# Patient Record
Sex: MALE | Race: BLACK OR AFRICAN AMERICAN | NOT HISPANIC OR LATINO | URBAN - METROPOLITAN AREA
[De-identification: names, ages, dates, MRNs, and addresses within clinical notes are randomized per-mention and may not be internally consistent; named-entity substitution may affect disease eponyms.]

---

## 2023-03-28 ENCOUNTER — OFFICE (OUTPATIENT)
Dept: URBAN - METROPOLITAN AREA CLINIC 19 | Facility: CLINIC | Age: 73
End: 2023-03-28

## 2023-03-28 VITALS
DIASTOLIC BLOOD PRESSURE: 61 MMHG | HEIGHT: 70 IN | SYSTOLIC BLOOD PRESSURE: 117 MMHG | WEIGHT: 229 LBS | OXYGEN SATURATION: 100 % | HEART RATE: 80 BPM

## 2023-03-28 DIAGNOSIS — Z86.010 PERSONAL HISTORY OF COLONIC POLYPS: ICD-10-CM

## 2023-03-28 DIAGNOSIS — K59.00 CONSTIPATION, UNSPECIFIED: ICD-10-CM

## 2023-03-28 DIAGNOSIS — R10.13 EPIGASTRIC PAIN: ICD-10-CM

## 2023-03-28 DIAGNOSIS — K21.9 GASTRO-ESOPHAGEAL REFLUX DISEASE WITHOUT ESOPHAGITIS: ICD-10-CM

## 2023-03-28 DIAGNOSIS — Z79.01 LONG TERM (CURRENT) USE OF ANTICOAGULANTS: ICD-10-CM

## 2023-03-28 PROCEDURE — 99204 OFFICE O/P NEW MOD 45 MIN: CPT

## 2023-03-28 RX ORDER — SODIUM PICOSULFATE, MAGNESIUM OXIDE, AND ANHYDROUS CITRIC ACID 10; 3.5; 12 MG/160ML; G/160ML; G/160ML
LIQUID ORAL
Qty: 320 | Refills: 0 | Status: COMPLETED
Start: 2023-03-28 | End: 2023-04-20

## 2023-03-28 NOTE — SERVICENOTES
The patient's assessment was reviewed with Dr. Camarillo and a collaborative plan of care was established.

## 2023-03-28 NOTE — SERVICEHPINOTES
72-year-old  black male referred by his PCP for chronic, worsening GERD, gas pain, heartburn and constipation.  He was recently increased from pantoprazole 40 milligrams daily to BID dosing as well as 40 milligrams daily.  He reports frequent breakthrough symptoms and heartburn.  He is currently in the midst of cardiac workup where he has been on Plavix for history of coronary artery disease and has had 5 stents placed in the past.  He was recently started on Xarelto in addition to the Plavix.  He does have a history of CHF as well as followed closely by cardiologist, Alex Hobbs MD.  He denies dysphagia.   He has also noticed black stool.  He typically has a bowel movement every few days and has noticed that it is "black".  He does admit to taking significant amounts of Mylanta and Gaviscon.  He denies iron supplements or Pepto-Bismol use.  Recent blood work with his PCP was reportedly normal ( records requested ).  He has had colonoscopies in the past at [facility] and has had colon polyps removed.  Apparently had an EGD and colonoscopy a year ago where he had a few polyps removed, but his bowel prep was poor (records requested ).  Family history is negative for colon neoplasm.

## 2023-03-31 ENCOUNTER — INPATIENT HOSPITAL (OUTPATIENT)
Dept: URBAN - METROPOLITAN AREA HOSPITAL 81 | Facility: HOSPITAL | Age: 73
End: 2023-03-31
Payer: COMMERCIAL

## 2023-03-31 DIAGNOSIS — K92.1 MELENA: ICD-10-CM

## 2023-03-31 DIAGNOSIS — D50.0 IRON DEFICIENCY ANEMIA SECONDARY TO BLOOD LOSS (CHRONIC): ICD-10-CM

## 2023-03-31 DIAGNOSIS — I25.10 ATHEROSCLEROTIC HEART DISEASE OF NATIVE CORONARY ARTERY WITH: ICD-10-CM

## 2023-03-31 PROCEDURE — 99222 1ST HOSP IP/OBS MODERATE 55: CPT | Performed by: INTERNAL MEDICINE

## 2023-04-01 ENCOUNTER — INPATIENT HOSPITAL (OUTPATIENT)
Dept: URBAN - METROPOLITAN AREA HOSPITAL 81 | Facility: HOSPITAL | Age: 73
End: 2023-04-01
Payer: COMMERCIAL

## 2023-04-01 DIAGNOSIS — D50.0 IRON DEFICIENCY ANEMIA SECONDARY TO BLOOD LOSS (CHRONIC): ICD-10-CM

## 2023-04-01 DIAGNOSIS — K92.1 MELENA: ICD-10-CM

## 2023-04-01 DIAGNOSIS — I25.10 ATHEROSCLEROTIC HEART DISEASE OF NATIVE CORONARY ARTERY WITH: ICD-10-CM

## 2023-04-01 PROCEDURE — 99232 SBSQ HOSP IP/OBS MODERATE 35: CPT | Performed by: INTERNAL MEDICINE

## 2023-04-02 ENCOUNTER — INPATIENT HOSPITAL (OUTPATIENT)
Dept: URBAN - METROPOLITAN AREA HOSPITAL 81 | Facility: HOSPITAL | Age: 73
End: 2023-04-02
Payer: COMMERCIAL

## 2023-04-02 DIAGNOSIS — I25.10 ATHEROSCLEROTIC HEART DISEASE OF NATIVE CORONARY ARTERY WITH: ICD-10-CM

## 2023-04-02 DIAGNOSIS — D50.0 IRON DEFICIENCY ANEMIA SECONDARY TO BLOOD LOSS (CHRONIC): ICD-10-CM

## 2023-04-02 DIAGNOSIS — K92.1 MELENA: ICD-10-CM

## 2023-04-02 PROCEDURE — 99231 SBSQ HOSP IP/OBS SF/LOW 25: CPT | Performed by: INTERNAL MEDICINE

## 2023-04-03 ENCOUNTER — INPATIENT HOSPITAL (OUTPATIENT)
Dept: URBAN - METROPOLITAN AREA HOSPITAL 81 | Facility: HOSPITAL | Age: 73
End: 2023-04-03
Payer: COMMERCIAL

## 2023-04-03 DIAGNOSIS — K44.9 DIAPHRAGMATIC HERNIA WITHOUT OBSTRUCTION OR GANGRENE: ICD-10-CM

## 2023-04-03 DIAGNOSIS — K92.2 GASTROINTESTINAL HEMORRHAGE, UNSPECIFIED: ICD-10-CM

## 2023-04-03 PROCEDURE — 43235 EGD DIAGNOSTIC BRUSH WASH: CPT | Performed by: INTERNAL MEDICINE

## 2023-04-04 ENCOUNTER — INPATIENT HOSPITAL (OUTPATIENT)
Dept: URBAN - METROPOLITAN AREA HOSPITAL 81 | Facility: HOSPITAL | Age: 73
End: 2023-04-04
Payer: COMMERCIAL

## 2023-04-04 DIAGNOSIS — K92.1 MELENA: ICD-10-CM

## 2023-04-04 DIAGNOSIS — I25.10 ATHEROSCLEROTIC HEART DISEASE OF NATIVE CORONARY ARTERY WITH: ICD-10-CM

## 2023-04-04 DIAGNOSIS — D50.0 IRON DEFICIENCY ANEMIA SECONDARY TO BLOOD LOSS (CHRONIC): ICD-10-CM

## 2023-04-04 PROCEDURE — 99231 SBSQ HOSP IP/OBS SF/LOW 25: CPT | Performed by: INTERNAL MEDICINE

## 2023-04-05 ENCOUNTER — INPATIENT HOSPITAL (OUTPATIENT)
Dept: URBAN - METROPOLITAN AREA HOSPITAL 81 | Facility: HOSPITAL | Age: 73
End: 2023-04-05
Payer: COMMERCIAL

## 2023-04-05 DIAGNOSIS — D50.9 IRON DEFICIENCY ANEMIA, UNSPECIFIED: ICD-10-CM

## 2023-04-05 DIAGNOSIS — K64.0 FIRST DEGREE HEMORRHOIDS: ICD-10-CM

## 2023-04-05 DIAGNOSIS — D12.2 BENIGN NEOPLASM OF ASCENDING COLON: ICD-10-CM

## 2023-04-05 PROCEDURE — 45385 COLONOSCOPY W/LESION REMOVAL: CPT | Performed by: INTERNAL MEDICINE

## 2023-04-15 ENCOUNTER — INPATIENT HOSPITAL (OUTPATIENT)
Dept: URBAN - METROPOLITAN AREA HOSPITAL 81 | Facility: HOSPITAL | Age: 73
End: 2023-04-15
Payer: COMMERCIAL

## 2023-04-15 DIAGNOSIS — K92.1 MELENA: ICD-10-CM

## 2023-04-15 DIAGNOSIS — I25.10 ATHEROSCLEROTIC HEART DISEASE OF NATIVE CORONARY ARTERY WITH: ICD-10-CM

## 2023-04-15 DIAGNOSIS — K63.5 POLYP OF COLON: ICD-10-CM

## 2023-04-15 DIAGNOSIS — D50.0 IRON DEFICIENCY ANEMIA SECONDARY TO BLOOD LOSS (CHRONIC): ICD-10-CM

## 2023-04-15 PROCEDURE — 99222 1ST HOSP IP/OBS MODERATE 55: CPT | Performed by: INTERNAL MEDICINE

## 2023-04-16 ENCOUNTER — INPATIENT HOSPITAL (OUTPATIENT)
Dept: URBAN - METROPOLITAN AREA HOSPITAL 81 | Facility: HOSPITAL | Age: 73
End: 2023-04-16
Payer: COMMERCIAL

## 2023-04-16 DIAGNOSIS — K59.00 CONSTIPATION, UNSPECIFIED: ICD-10-CM

## 2023-04-16 DIAGNOSIS — K21.9 GASTRO-ESOPHAGEAL REFLUX DISEASE WITHOUT ESOPHAGITIS: ICD-10-CM

## 2023-04-16 DIAGNOSIS — K92.1 MELENA: ICD-10-CM

## 2023-04-16 DIAGNOSIS — D50.0 IRON DEFICIENCY ANEMIA SECONDARY TO BLOOD LOSS (CHRONIC): ICD-10-CM

## 2023-04-16 PROCEDURE — 99232 SBSQ HOSP IP/OBS MODERATE 35: CPT | Performed by: INTERNAL MEDICINE

## 2023-04-17 ENCOUNTER — INPATIENT HOSPITAL (OUTPATIENT)
Dept: URBAN - METROPOLITAN AREA HOSPITAL 81 | Facility: HOSPITAL | Age: 73
End: 2023-04-17
Payer: COMMERCIAL

## 2023-04-17 DIAGNOSIS — K59.00 CONSTIPATION, UNSPECIFIED: ICD-10-CM

## 2023-04-17 DIAGNOSIS — Z79.01 LONG TERM (CURRENT) USE OF ANTICOAGULANTS: ICD-10-CM

## 2023-04-17 DIAGNOSIS — K63.5 POLYP OF COLON: ICD-10-CM

## 2023-04-17 DIAGNOSIS — K92.1 MELENA: ICD-10-CM

## 2023-04-17 DIAGNOSIS — I25.10 ATHEROSCLEROTIC HEART DISEASE OF NATIVE CORONARY ARTERY WITH: ICD-10-CM

## 2023-04-17 DIAGNOSIS — D50.9 IRON DEFICIENCY ANEMIA, UNSPECIFIED: ICD-10-CM

## 2023-04-17 PROCEDURE — 99232 SBSQ HOSP IP/OBS MODERATE 35: CPT | Performed by: INTERNAL MEDICINE

## 2023-04-20 ENCOUNTER — OFFICE (OUTPATIENT)
Dept: URBAN - METROPOLITAN AREA CLINIC 19 | Facility: CLINIC | Age: 73
End: 2023-04-20

## 2023-04-20 VITALS
HEART RATE: 74 BPM | HEIGHT: 70 IN | WEIGHT: 225 LBS | OXYGEN SATURATION: 100 % | SYSTOLIC BLOOD PRESSURE: 109 MMHG | DIASTOLIC BLOOD PRESSURE: 66 MMHG

## 2023-04-20 DIAGNOSIS — Z79.01 LONG TERM (CURRENT) USE OF ANTICOAGULANTS: ICD-10-CM

## 2023-04-20 DIAGNOSIS — K59.00 CONSTIPATION, UNSPECIFIED: ICD-10-CM

## 2023-04-20 DIAGNOSIS — K21.9 GASTRO-ESOPHAGEAL REFLUX DISEASE WITHOUT ESOPHAGITIS: ICD-10-CM

## 2023-04-20 DIAGNOSIS — Z86.010 PERSONAL HISTORY OF COLONIC POLYPS: ICD-10-CM

## 2023-04-20 DIAGNOSIS — D50.0 IRON DEFICIENCY ANEMIA SECONDARY TO BLOOD LOSS (CHRONIC): ICD-10-CM

## 2023-04-20 PROCEDURE — 99214 OFFICE O/P EST MOD 30 MIN: CPT

## 2023-04-20 NOTE — SERVICEHPINOTES
72-year-old  black male returns for follow-up after 2 recent hospitalizations at [facility] for GI bleeds requiring multiple blood transfusions.
pilar quezada   I initially saw the patient 3/28/23 as a referral from his PCP.  I did not have any records from his referring provider at the time.  He was complaining of chronic, worsening GERD, gasping, heartburn and constipation.  He denies any overt GI bleeding at that time, including melena, hematochezia or rectal bleeding.  His pantoprazole was increased from 40 mg/d to BID dosing.  I added famotidine 40 mg for him to take at bedtime as well.  I ordered an upper GI as well as an endoscopy and colonoscopy .  After the patient left the office I received records from his PCP where routine blood work 3/1/23 found microcytic anemia with Hct=25.1% of 25.1 percent.  I advised the patient to come back to our office to have additional blood work drawn with CBC and anemia profile.   He also informed me that he was starting to have "black" stools. His hematocrit was 17.9% and I called him advising him to go immediately to the ED at [facility] 3/31/23.  He received 2 units of PRBCs.  He underwent an EGD 4/3/23 which was unremarkable.  Colonoscopy 4/5/23 found hemorrhoids, 2 small adenomatous polyps and a small nonneoplastic polyp.  He was discharged 4/5/23 and encouraged to follow-up outpatient here at [facility] to discuss having a PillCam study done.  An appointment had not been made. but he experienced several episodes of hematochezia.  He returned to the ED at [facility] where he received 5 units of PRBCs for presumed polypectomy bleed.  He did not have any additional scopes done at that time, but was discharged 4/18/23 with hematocrit of 22.3%t and encouraged to follow-up as soon as possible with [facility] for a PillCam study.  He had 1 episode of bloody stools when he was hospitalized the 2nd time, but has had no subsequent symptoms.
pilar Burksince discharge, he has been on a clear liquid diet and denies any subsequent episodes of overt GI bleeding.  He has not had a bowel movement since he was hospitalized.  He does have chronic constipation and has been on Linzess 145 mcg and 290 mcg dosing.  He continues to complain of persistent GERD.  He takes pantoprazole 40 mg BID and famotidine 40 milligrams at bedtime.  He denies NSAID use.  He denies dysphagia or abdominal pain.  He takes Plavix and Xarelto and is followed closely by cardiologist, Alex Hobbs MD. He is also on oral iron therapy.
br
br   Colonoscopy 8/24/21 by Ricci Garcia MD at [facility] found a large sacral wound and diverticular, but bowel prep was fair inadequate.  A repeat colonoscopy was recommended in a year.br
br   Family history is negative for colon neoplasm.

## 2023-05-02 ENCOUNTER — OFFICE (OUTPATIENT)
Dept: URBAN - METROPOLITAN AREA CLINIC 11 | Facility: CLINIC | Age: 73
End: 2023-05-02
Payer: COMMERCIAL

## 2023-05-02 DIAGNOSIS — D50.9 IRON DEFICIENCY ANEMIA, UNSPECIFIED: ICD-10-CM

## 2023-05-02 PROCEDURE — 91110 GI TRC IMG INTRAL ESOPH-ILE: CPT | Performed by: INTERNAL MEDICINE
